# Patient Record
Sex: FEMALE
[De-identification: names, ages, dates, MRNs, and addresses within clinical notes are randomized per-mention and may not be internally consistent; named-entity substitution may affect disease eponyms.]

---

## 2019-04-16 ENCOUNTER — HOSPITAL ENCOUNTER (EMERGENCY)
Dept: HOSPITAL 14 - H.ER | Age: 29
Discharge: HOME | End: 2019-04-16
Payer: SELF-PAY

## 2019-04-16 VITALS
HEART RATE: 80 BPM | TEMPERATURE: 98.2 F | DIASTOLIC BLOOD PRESSURE: 70 MMHG | SYSTOLIC BLOOD PRESSURE: 114 MMHG | RESPIRATION RATE: 16 BRPM

## 2019-04-16 VITALS — BODY MASS INDEX: 24.2 KG/M2

## 2019-04-16 VITALS — OXYGEN SATURATION: 98 %
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
## 2019-04-16 DIAGNOSIS — S39.012A: ICD-10-CM

## 2019-04-16 DIAGNOSIS — J30.2: Primary | ICD-10-CM

## 2019-04-16 NOTE — ED PDOC
HPI: General Adult


Time Seen by Provider: 04/16/19 11:37


Chief Complaint (Nursing): Headache


Chief Complaint (Provider): nasal congestion/headache


History Per: Patient (28 y/o female here with nasal congestion/ear pressure/ 

upper back pain ongoing x 4 days.  Takes allegra without improvement. Denies any

heavy lifting.)





Past Medical History


Reviewed: Historical Data, Nursing Documentation, Vital Signs


Vital Signs: 





                                Last Vital Signs











Temp  98.6 F   04/16/19 10:19


 


Pulse  94 H  04/16/19 10:19


 


Resp  17   04/16/19 10:19


 


BP  117/68   04/16/19 10:19


 


Pulse Ox  98   04/16/19 10:19














- Family History


Family History: States: No Known Family Hx





- Immunization History


Hx Influenza Vaccination: Yes (10/01/15)





- Home Medications


Home Medications: 


                                Ambulatory Orders











 Medication  Instructions  Recorded


 


Fluticasone Nasal [Flonase] 2 spray NS DAILY #1 bottle 04/16/19


 


Ibuprofen [Motrin] 600 mg PO Q8 PRN #21 tab 04/16/19


 


Pseudoephedrine [Sudafed Tab] 60 mg PO Q6 PRN #24 tab 04/16/19














- Allergies


Allergies/Adverse Reactions: 


                                    Allergies











Allergy/AdvReac Type Severity Reaction Status Date / Time


 


No Known Allergies Allergy   Verified 04/16/19 10:30














Review of Systems


ROS Statement: Except As Marked, All Systems Reviewed And Found Negative





Physical Exam





- Reviewed


Nursing Documentation Reviewed: Yes


Vital Signs Reviewed: Yes





- Physical Exam


Appears: Positive for: Well, Non-toxic, No Acute Distress


Head Exam: Positive for: ATRAUMATIC, NORMAL INSPECTION, NORMOCEPHALIC


Skin: Positive for: Normal Color, Warm, DRY


Eye Exam: Positive for: EOMI, Normal appearance, PERRL


ENT: Negative for: Normal ENT Inspection (decreased cone of light bilateral TM)


Neck: Positive for: Normal, Painless ROM


Cardiovascular/Chest: Positive for: Regular Rate, Rhythm


Respiratory: Positive for: CNT, Normal Breath Sounds


Gastrointestinal/Abdominal: Positive for: Normal Exam, Soft


Back: Positive for: Normal Inspection


Extremity: Positive for: Normal ROM


Neurological/Psych: Positive for: Awake, Alert, Normal Tone





- Laboratory Results


Urine Pregnancy POC: Negative





- ECG


O2 Sat by Pulse Oximetry: 98





Disposition





- Clinical Impression


Clinical Impression: 


 Seasonal allergic rhinitis, Back strain








- Patient ED Disposition


Is Patient to be Admitted: No





- Disposition


Referrals: 


formerly [facility] [Outside]


Disposition: Routine/Home


Disposition Time: 11:38


Condition: FAIR


Prescriptions: 


Fluticasone Nasal [Flonase] 2 spray NS DAILY #1 bottle


Ibuprofen [Motrin] 600 mg PO Q8 PRN #21 tab


 PRN Reason: Pain, Moderate (4-7)


Pseudoephedrine [Sudafed Tab] 60 mg PO Q6 PRN #24 tab


 PRN Reason: Nasal Congestion


Instructions:  Muscle Strain, Seasonal Allergies in Adults


Print Language: Indonesian